# Patient Record
Sex: MALE | Race: WHITE | Employment: FULL TIME | URBAN - METROPOLITAN AREA
[De-identification: names, ages, dates, MRNs, and addresses within clinical notes are randomized per-mention and may not be internally consistent; named-entity substitution may affect disease eponyms.]

---

## 2017-02-09 ENCOUNTER — OFFICE VISIT (OUTPATIENT)
Dept: FAMILY MEDICINE CLINIC | Facility: CLINIC | Age: 57
End: 2017-02-09

## 2017-02-09 VITALS
TEMPERATURE: 98 F | OXYGEN SATURATION: 98 % | HEIGHT: 70 IN | WEIGHT: 220 LBS | RESPIRATION RATE: 14 BRPM | DIASTOLIC BLOOD PRESSURE: 74 MMHG | HEART RATE: 62 BPM | BODY MASS INDEX: 31.5 KG/M2 | SYSTOLIC BLOOD PRESSURE: 110 MMHG

## 2017-02-09 DIAGNOSIS — R05.8 COUGH WITH CONGESTION OF PARANASAL SINUS: ICD-10-CM

## 2017-02-09 DIAGNOSIS — R05.8 POST-VIRAL COUGH SYNDROME: Primary | ICD-10-CM

## 2017-02-09 DIAGNOSIS — R09.81 COUGH WITH CONGESTION OF PARANASAL SINUS: ICD-10-CM

## 2017-02-09 PROCEDURE — 99203 OFFICE O/P NEW LOW 30 MIN: CPT | Performed by: NURSE PRACTITIONER

## 2017-02-09 RX ORDER — PREDNISONE 20 MG/1
TABLET ORAL
Qty: 6 TABLET | Refills: 0 | Status: SHIPPED | OUTPATIENT
Start: 2017-02-09

## 2017-02-09 NOTE — PROGRESS NOTES
CHIEF COMPLAINT:   Patient presents with:  URI      HPI:   Trinh Partida is a 61year old male who presents for upper respiratory symptoms for  30 days. Patient reports cough Location is upper chest, middle throat.  Symptoms are described as scratchy,  a t EYES: conjunctiva clear, EOM intact, normal pupils, PERRLA  EARS: Canals are clear with no discharge or inflammation, TM's are white and intact, no bulging, no retraction, bony landmarks are visible. Fluid is present behind bothTM's.    NOSE: nostrils art predniSONE 20 MG Oral Tab 6 tablet 0      Sig: Take 2 tabs PO daily for 3 days. Risks, benefits, and side effects of medication explained and discussed. Patient verbalized understanding of diagnosis and treatment. All questions answered.  No i Date Last Reviewed: 5/1/2016  © 5769-4083 [employer] [address] [place] [address], [address] [place], [place] [address]. All rights reserved. This information is not intended as a substitute for professional medical care.  Always follow your healthcare professional'

## 2017-02-09 NOTE — PATIENT INSTRUCTIONS
Discharge Instructions: Coughing Techniques  Airway clearance techniques help to remove mucus from the airways. Clearing the airways helps to improve breathing and lessen the chance of infection. One method is controlled coughing.  Be sure to also use any Airway clearance techniques help to remove mucus from the airways. Clearing the airways helps to improve breathing and lessen the chance of infection. One method is controlled coughing.  Be sure to also use any medicines before or after clearing your airway

## 2023-05-24 DIAGNOSIS — Z82.49 FAMILY HISTORY OF HEART DISEASE: ICD-10-CM

## 2023-05-24 DIAGNOSIS — E78.2 MIXED HYPERLIPIDEMIA: Primary | ICD-10-CM

## 2023-05-30 ENCOUNTER — HOSPITAL ENCOUNTER (OUTPATIENT)
Dept: CT IMAGING | Age: 63
Discharge: HOME | End: 2023-05-30
Attending: INTERNAL MEDICINE

## 2023-05-30 DIAGNOSIS — E78.2 MIXED HYPERLIPIDEMIA: ICD-10-CM

## 2023-05-30 DIAGNOSIS — Z82.49 FAMILY HISTORY OF HEART DISEASE: ICD-10-CM

## 2023-05-30 PROCEDURE — 75571 CT HRT W/O DYE W/CA TEST: CPT

## 2023-05-30 PROCEDURE — 75571 CT HRT W/O DYE W/CA TEST: CPT | Performed by: INTERNAL MEDICINE

## 2023-06-23 ENCOUNTER — TELEPHONE (OUTPATIENT)
Dept: CARDIOLOGY | Age: 63
End: 2023-06-23

## (undated) NOTE — MR AVS SNAPSHOT
[address]  [phone]               Thank you for choosing us for your health care visit with Kat Bowens NP.   We are glad to serve you and happy to provide you with this summary of Make a follow-up appointment as directed by our staff.   When to call the healthcare provider  Call your healthcare provider immediately if you have any of the following:  · Shortness of breath, wheezing, or coughing  · Increased mucus  · Yellow, green, blo substitute for professional medical care. Always follow your healthcare professional's instructions.              Allergies as of Feb 09, 2017     Not on File                Today's Vital Signs     BP Pulse Temp Height Weight BMI    110/74 mmHg 62 98 °F (36 Don’t eat while when you’re bored.      EAT THESE FOODS MORE OFTEN: EAT THESE FOODS LESS OFTEN:   Make half your plate fruits and vegetables Highly refined, white starches including white bread, rice and pasta   Eat plenty of protein, keep the fat content l